# Patient Record
Sex: MALE | Race: OTHER | Employment: FULL TIME | URBAN - METROPOLITAN AREA
[De-identification: names, ages, dates, MRNs, and addresses within clinical notes are randomized per-mention and may not be internally consistent; named-entity substitution may affect disease eponyms.]

---

## 2020-05-11 ENCOUNTER — TELEPHONE (OUTPATIENT)
Dept: ADMINISTRATIVE | Age: 50
End: 2020-05-11

## 2020-05-11 ENCOUNTER — OFFICE VISIT (OUTPATIENT)
Dept: FAMILY MEDICINE CLINIC | Age: 50
End: 2020-05-11
Payer: COMMERCIAL

## 2020-05-11 VITALS
OXYGEN SATURATION: 97 % | TEMPERATURE: 97.6 F | HEART RATE: 58 BPM | DIASTOLIC BLOOD PRESSURE: 58 MMHG | WEIGHT: 199 LBS | SYSTOLIC BLOOD PRESSURE: 124 MMHG

## 2020-05-11 LAB
APPEARANCE FLUID: NORMAL
BILIRUBIN, POC: NORMAL
BLOOD URINE, POC: NORMAL
CLARITY, POC: CLEAR
COLOR, POC: YELLOW
GLUCOSE URINE, POC: NORMAL
KETONES, POC: NORMAL
LEUKOCYTE EST, POC: NORMAL
NITRITE, POC: NORMAL
PH, POC: 5.5
PROTEIN, POC: NORMAL
SPECIFIC GRAVITY, POC: 1.02
UROBILINOGEN, POC: 0.2

## 2020-05-11 PROCEDURE — 99213 OFFICE O/P EST LOW 20 MIN: CPT | Performed by: FAMILY MEDICINE

## 2020-05-11 PROCEDURE — 4004F PT TOBACCO SCREEN RCVD TLK: CPT | Performed by: FAMILY MEDICINE

## 2020-05-11 PROCEDURE — G8428 CUR MEDS NOT DOCUMENT: HCPCS | Performed by: FAMILY MEDICINE

## 2020-05-11 PROCEDURE — 3017F COLORECTAL CA SCREEN DOC REV: CPT | Performed by: FAMILY MEDICINE

## 2020-05-11 PROCEDURE — G8421 BMI NOT CALCULATED: HCPCS | Performed by: FAMILY MEDICINE

## 2020-05-11 PROCEDURE — 81002 URINALYSIS NONAUTO W/O SCOPE: CPT | Performed by: FAMILY MEDICINE

## 2020-05-11 RX ORDER — IBUPROFEN 600 MG/1
600 TABLET ORAL EVERY 6 HOURS PRN
Qty: 60 TABLET | Refills: 1 | Status: SHIPPED
Start: 2020-05-11 | End: 2020-10-15

## 2020-05-11 RX ORDER — METHYLPREDNISOLONE 4 MG/1
TABLET ORAL
Qty: 1 KIT | Refills: 0 | Status: SHIPPED
Start: 2020-05-11 | End: 2020-10-15

## 2020-05-11 RX ORDER — CYCLOBENZAPRINE HCL 10 MG
10 TABLET ORAL 3 TIMES DAILY PRN
Qty: 21 TABLET | Refills: 1 | Status: SHIPPED | OUTPATIENT
Start: 2020-05-11 | End: 2020-05-21

## 2020-05-11 ASSESSMENT — ENCOUNTER SYMPTOMS
RESPIRATORY NEGATIVE: 1
BACK PAIN: 1

## 2020-05-11 NOTE — TELEPHONE ENCOUNTER
I do not see where I have ever seen him, so would be a new patient.   Okay to schedule a virtual visit as long as not on controlled substances etc. according to policy

## 2020-05-11 NOTE — TELEPHONE ENCOUNTER
Patient called to schedule an appointment as he is having some back pain. I do not see any past records for him. He stated he has seen you in the past year and that his children are your patients. Will you take him as New or has he been seen and we do not see the records?  Please advise

## 2020-10-15 ENCOUNTER — OFFICE VISIT (OUTPATIENT)
Dept: PRIMARY CARE CLINIC | Age: 50
End: 2020-10-15
Payer: COMMERCIAL

## 2020-10-15 VITALS
WEIGHT: 197 LBS | HEART RATE: 77 BPM | SYSTOLIC BLOOD PRESSURE: 136 MMHG | BODY MASS INDEX: 29.86 KG/M2 | OXYGEN SATURATION: 97 % | HEIGHT: 68 IN | DIASTOLIC BLOOD PRESSURE: 72 MMHG | TEMPERATURE: 97.8 F

## 2020-10-15 PROBLEM — Z00.00 HEALTH MAINTENANCE EXAMINATION: Status: ACTIVE | Noted: 2020-10-15

## 2020-10-15 PROBLEM — J45.909 UNCOMPLICATED ASTHMA: Status: ACTIVE | Noted: 2020-10-15

## 2020-10-15 PROCEDURE — 99396 PREV VISIT EST AGE 40-64: CPT | Performed by: FAMILY MEDICINE

## 2020-10-15 PROCEDURE — G8484 FLU IMMUNIZE NO ADMIN: HCPCS | Performed by: FAMILY MEDICINE

## 2020-10-15 ASSESSMENT — PATIENT HEALTH QUESTIONNAIRE - PHQ9
SUM OF ALL RESPONSES TO PHQ QUESTIONS 1-9: 0
2. FEELING DOWN, DEPRESSED OR HOPELESS: 0
SUM OF ALL RESPONSES TO PHQ9 QUESTIONS 1 & 2: 0
SUM OF ALL RESPONSES TO PHQ QUESTIONS 1-9: 0
1. LITTLE INTEREST OR PLEASURE IN DOING THINGS: 0
SUM OF ALL RESPONSES TO PHQ QUESTIONS 1-9: 0

## 2020-10-15 NOTE — ASSESSMENT & PLAN NOTE
Health maintenance issues discussed at length as above. Encouraged yearly physicals.   Refer for colonoscopy

## 2020-10-15 NOTE — ASSESSMENT & PLAN NOTE
Counseled. Follows regularly with pulmonologist in [facility], Dr. Ulises Antonio.   Maintained on Breo, rinse mouth after, Singulair, occasional albuterol but uses/needs infrequently

## 2020-10-15 NOTE — PROGRESS NOTES
10/15/20  Edith Webb : 1970 Sex: male  Age: 47 y.o. Chief Complaint   Patient presents with   [facility]   . physical    HPI  HPI:    Patient presents today to reestablish and for physical.  Overall feels well. He is worried about his dad's diagnosis of leukemia. He has no symptoms. Denies fever chills weight loss night sweats or otherwise. Would like blood work. Counseled on weight appropriate diet and exercise as well. Health Maintenance:  Proper diet reviewed including Mediterranean and DASH diets. Counseled on healthy weight, appropriate exercise, avoidance of tobacco, and recommendations for minimal to no alcohol consumption. Counseled on the potential pros and cons of vitamins and supplements. Reviewed the recommendations and risk/benefits of vaccines including Td/Tdap (Declines) ,  Pneumovax (he will discuss with pulm),  prevnar 13 (he will discuss with pulm), flu vaccine (will get thru pulmonologist), Hepatitis vaccines (had B series for work, declines A),  and shingrix (patient had chicken pox) (encouraged). HIV and Hep C screening guidelines were reviewed. (Declines)  Importance of regular eye and dental exams and health reviewed. Risks/Benefits of ASA reviewed and discussed latest guidelines. Sun protection reviewed. Notify if any new or changing moles/skin lesions, etc. Dexa Scan indications/ risk factors for osteoporotic fractures (and associated M/M) and preventative measures reviewed. Counseled on testicular exams and prostate exams. Colonoscopy recommendations reviewed. Pt denies change in bowel habits or [address] of colon polyps/CA. REF. Fit test offered, declines. Defers EKG  Discussed the indications for additional  cardiac testing including referrals, stress testing,  2d echo, ect. Not interested and ASX. Reviewed indications for other testing such as  PFT's.and  indications for imaging including brain, carotid, chest, abdominal, aortic .   Pt is ASX and not interested at this time. Review of Systems  ROS:  Const: Denies chills, fever, malaise and sweats. Eyes: Denies discharge, pain, redness and visual disturbance. ENMT: Denies earaches, other ear symptoms. Denies nasal or sinus symptoms other than stated  above. Denies mouth and tongue lesions and sore throat. CV: Denies chest discomfort, pain; diaphoresis, dizziness, edema, lightheadedness, orthopnea,  palpitations, syncope and near syncopal episode or any exertional symptoms  Resp: Denies cough, hemoptysis, pleuritic pain, SOB, sputum production and wheezing. GI: Denies abdominal pain, change in bowel habits, hematochezia, melena, nausea and vomiting. : Denies urinary symptoms including dysuria , urgency, frequency or hematuria. Musculo: Denies musculoskeletal symptoms. Skin: Denies bruising and rash.   Neuro: Denies headache, numbness, stiff neck, tingling and focal weakness slurred speech or facial  droop  Hema/Lymph: Denies bleeding/bruising tendency and enlarged lymph nodes        Current Outpatient Medications:     Fluticasone Furoate-Vilanterol (BREO ELLIPTA IN), Inhale into the lungs, Disp: , Rfl:     MONTELUKAST SODIUM PO, Take by mouth, Disp: , Rfl:   No Known Allergies    Past Medical History:   Diagnosis Date    Asthma     Follows with Dr Ever Santoro)     Past Surgical History:   Procedure Laterality Date    WISDOM TOOTH EXTRACTION       Family History   Problem Relation Age of Onset    Cancer Father         leukemia, dx age 68    Cancer Paternal Grandmother          leukemia 80    Heart Attack Mother          76     Social History     Tobacco Use    Smoking status: Never Smoker    Smokeless tobacco: Never Used   Substance Use Topics    Alcohol use: Yes     Comment: occ    Drug use: Never      Social History     Social History Narrative    [place]         Wife Manages [place], [place]        3 kids (ages 25, 16, 6)        Grzegorz Basket:    10/15/20 1325   BP: 136/72   Pulse: 77   Temp: 97.8 °F (36.6 °C)   SpO2: 97%   Weight: 197 lb (89.4 kg)   Height: 5' 8\" (1.727 m)      Wt Readings from Last 3 Encounters:   10/15/20 197 lb (89.4 kg)   05/11/20 199 lb (90.3 kg)        Physical Exam  Exam:  Const: Appears comfortable. No signs of acute distress present. Head/Face: Atraumatic on inspection. Eyes: EOMI in both eyes. No discharge from the eyes. PERRL. Sclerae clear. ENMT: Auditory canals normal. Tympanic membranes: intact and translucent. External nose WNL. Nasal mucosa is clear. Oropharynx: No erythema or exudate. Posterior pharynx is normal.  Neck: Supple. Palpation reveals no adenopathy. No masses appreciated. No JVD. Carotids: no  bruits. Resp: Respirations are unlabored. Clear to auscultation. No rales, rhonchi or wheezes appreciated  over the lungs bilaterally. CV: Rate is regular. Rhythm is regular. No gallop or rubs. No heart murmur appreciated. Extremities: No clubbing, cyanosis, or edema. No calf inflammation or tenderness. Abdomen: Bowel sounds are normoactive. Abdomen is soft, nontender, and nondistended. No  abdominal masses. No palpable hepatosplenomegaly. Lymph: No palpable or visible regional lymphadenopathy. Musculoskeletal: no acute joint inflammation. Skin: Dry and warm with no rash. Skin normal to inspection and palpation overall. Neuro: Alert and oriented. Affect: appropriate. Upper Extremities: 5/5 bilaterally. Lower Extremities:  5/5 bilaterally. Sensation intact to light touch. Reflexes: DTR's are symmetric and 2+ bilaterally. .  Cranial Nerves: Cranial nerves grossly intact. Genitalia NL male, testes descended without nodularity or hernia with and without valsalva. Rectal NL tone, stools heme neg. Prostate smoothe, not enlarged, without appreciable nodularity or asymmetry. Office Labs This Visit :  No results found for this visit on 10/15/20. Assessment and Plan:   Diagnosis Orders   1.  Health maintenance examination  Psa screening    Lipid Panel    TSH without Reflex    Comprehensive Metabolic Panel    CBC Auto Differential    Urinalysis   2. Uncomplicated asthma, unspecified asthma severity, unspecified whether persistent     3. Colon cancer screening  Ambulatory referral to General Surgery       Health maintenance examination  Health maintenance issues discussed at length as above. Encouraged yearly physicals. Refer for colonoscopy    Uncomplicated asthma  Counseled. Follows regularly with pulmonologist in [facility], Dr. Christie Barrett. Maintained on Breo, rinse mouth after, Singulair, occasional albuterol but uses/needs infrequently         No flowsheet data found. Plan as above. Counseled extensively and differential diagnoses relevant to above were reviewed, including serious etiologies. Side effects and interactions of medications were reviewed. Check with insurance, get blood work call for results follow-up with abnormal as needed otherwise follow-up 1 year sooner as needed. Refer for colonoscopy. As long as symptoms steadily improve/resolve, and medical conditions follow the expected course, FU as below, sooner PRN. Return in about 1 year (around 10/15/2021), or if symptoms worsen or fail to improve, for physical.         Signs and symptoms to watch for discussed, serious signs and symptoms reviewed. ER if any. Alise Macdonald MD    Patients are advised to check with insurance company to ensure coverage and to fully understand benefits and cost prior to any testing. This note was created with the assistance of voice recognition software. Document was reviewed however may contain grammatical errors.

## 2020-10-26 ENCOUNTER — HOSPITAL ENCOUNTER (OUTPATIENT)
Age: 50
Discharge: HOME | End: 2020-10-28
Payer: COMMERCIAL

## 2020-10-26 LAB
ALBUMIN SERPL-MCNC: 4.4 G/DL (ref 3.5–5.2)
ALP BLD-CCNC: 46 U/L (ref 40–129)
ALT SERPL-CCNC: 22 U/L (ref 0–40)
ANION GAP SERPL CALCULATED.3IONS-SCNC: 13 MMOL/L (ref 7–16)
AST SERPL-CCNC: 27 U/L (ref 0–39)
BACTERIA: ABNORMAL /HPF
BASOPHILS ABSOLUTE: 0.04 E9/L (ref 0–0.2)
BASOPHILS RELATIVE PERCENT: 0.8 % (ref 0–2)
BILIRUB SERPL-MCNC: 0.8 MG/DL (ref 0–1.2)
BILIRUBIN URINE: NEGATIVE
BLOOD, URINE: NEGATIVE
BUN BLDV-MCNC: 15 MG/DL (ref 6–20)
CALCIUM SERPL-MCNC: 9.8 MG/DL (ref 8.6–10.2)
CHLORIDE BLD-SCNC: 101 MMOL/L (ref 98–107)
CHOLESTEROL, TOTAL: 209 MG/DL (ref 0–199)
CLARITY: ABNORMAL
CO2: 22 MMOL/L (ref 22–29)
COLOR: YELLOW
CREAT SERPL-MCNC: 1.4 MG/DL (ref 0.7–1.2)
EOSINOPHILS ABSOLUTE: 0.04 E9/L (ref 0.05–0.5)
EOSINOPHILS RELATIVE PERCENT: 0.8 % (ref 0–6)
GFR AFRICAN AMERICAN: >60
GFR NON-AFRICAN AMERICAN: 53 ML/MIN/1.73
GLUCOSE BLD-MCNC: 90 MG/DL (ref 74–99)
GLUCOSE URINE: NEGATIVE MG/DL
HCT VFR BLD CALC: 43.3 % (ref 37–54)
HDLC SERPL-MCNC: 37 MG/DL
HEMOGLOBIN: 14.6 G/DL (ref 12.5–16.5)
IMMATURE GRANULOCYTES #: 0.02 E9/L
IMMATURE GRANULOCYTES %: 0.4 % (ref 0–5)
KETONES, URINE: NEGATIVE MG/DL
LDL CHOLESTEROL CALCULATED: 138 MG/DL (ref 0–99)
LEUKOCYTE ESTERASE, URINE: NEGATIVE
LYMPHOCYTES ABSOLUTE: 1.05 E9/L (ref 1.5–4)
LYMPHOCYTES RELATIVE PERCENT: 21.8 % (ref 20–42)
MCH RBC QN AUTO: 29.3 PG (ref 26–35)
MCHC RBC AUTO-ENTMCNC: 33.7 % (ref 32–34.5)
MCV RBC AUTO: 86.8 FL (ref 80–99.9)
MONOCYTES ABSOLUTE: 0.32 E9/L (ref 0.1–0.95)
MONOCYTES RELATIVE PERCENT: 6.6 % (ref 2–12)
NEUTROPHILS ABSOLUTE: 3.35 E9/L (ref 1.8–7.3)
NEUTROPHILS RELATIVE PERCENT: 69.6 % (ref 43–80)
NITRITE, URINE: NEGATIVE
PDW BLD-RTO: 11.8 FL (ref 11.5–15)
PH UA: 5.5 (ref 5–9)
PLATELET # BLD: 311 E9/L (ref 130–450)
PMV BLD AUTO: 9 FL (ref 7–12)
POTASSIUM SERPL-SCNC: 4.6 MMOL/L (ref 3.5–5)
PROSTATE SPECIFIC ANTIGEN: 0.42 NG/ML (ref 0–4)
PROTEIN UA: NEGATIVE MG/DL
RBC # BLD: 4.99 E12/L (ref 3.8–5.8)
RBC UA: ABNORMAL /HPF (ref 0–2)
SODIUM BLD-SCNC: 136 MMOL/L (ref 132–146)
SPECIFIC GRAVITY UA: >=1.03 (ref 1–1.03)
TOTAL PROTEIN: 8.5 G/DL (ref 6.4–8.3)
TRIGL SERPL-MCNC: 170 MG/DL (ref 0–149)
TSH SERPL DL<=0.05 MIU/L-ACNC: 2.88 UIU/ML (ref 0.27–4.2)
UROBILINOGEN, URINE: 0.2 E.U./DL
VLDLC SERPL CALC-MCNC: 34 MG/DL
WBC # BLD: 4.8 E9/L (ref 4.5–11.5)
WBC UA: ABNORMAL /HPF (ref 0–5)

## 2020-10-26 PROCEDURE — G0103 PSA SCREENING: HCPCS

## 2020-10-26 PROCEDURE — 80053 COMPREHEN METABOLIC PANEL: CPT

## 2020-10-26 PROCEDURE — 36415 COLL VENOUS BLD VENIPUNCTURE: CPT

## 2020-10-26 PROCEDURE — 81001 URINALYSIS AUTO W/SCOPE: CPT

## 2020-10-26 PROCEDURE — 85025 COMPLETE CBC W/AUTO DIFF WBC: CPT

## 2020-10-26 PROCEDURE — 80061 LIPID PANEL: CPT

## 2020-10-26 PROCEDURE — 84443 ASSAY THYROID STIM HORMONE: CPT

## 2020-10-28 ENCOUNTER — TELEPHONE (OUTPATIENT)
Dept: PRIMARY CARE CLINIC | Age: 50
End: 2020-10-28

## 2020-10-28 NOTE — RESULT ENCOUNTER NOTE
I reviewed above blood work with the patient. No urinary symptoms. No purulence or signs of infection. Specific gravity was greater than 1.030. He did have some water before blood work. Fasted all night. Creatinine 1.4, protein 8.5, counseled on triglyceride 170  lifestyle modification reviewed. TSH normal, CBC grossly normal, differential reviewed. He has absolutely no symptoms. Does not eat excessive protein or take supplements. No personal or family history of renal disease. Typically does not take NSAIDs. We did discuss avoidance of nephrotoxic agents including NSAIDs, excessive protein and encouraged proper hydration. At this point it was decided upon to repeat BUN/creatinine and protein with proper hydration. If continued abnormalities we would then consider imaging and referral.  He will do this, call for results follow-up of abnormal and as needed.

## 2020-11-13 ENCOUNTER — TELEPHONE (OUTPATIENT)
Dept: SURGERY | Age: 50
End: 2020-11-13

## 2020-11-13 NOTE — TELEPHONE ENCOUNTER
MA called the patient back and left a message to call the office back.     Electronically signed by Natasha Cabrera on 11/13/20 at 2:57 PM EST

## 2020-11-14 PROBLEM — Z00.00 HEALTH MAINTENANCE EXAMINATION: Status: RESOLVED | Noted: 2020-10-15 | Resolved: 2020-11-14

## 2021-02-25 ENCOUNTER — HOSPITAL ENCOUNTER (EMERGENCY)
Age: 51
Discharge: HOME | End: 2021-02-25
Attending: EMERGENCY MEDICINE
Payer: COMMERCIAL

## 2021-02-25 ENCOUNTER — APPOINTMENT (OUTPATIENT)
Dept: GENERAL RADIOLOGY | Age: 51
End: 2021-02-25
Payer: COMMERCIAL

## 2021-02-25 VITALS
HEIGHT: 68 IN | TEMPERATURE: 97.8 F | SYSTOLIC BLOOD PRESSURE: 146 MMHG | WEIGHT: 190 LBS | BODY MASS INDEX: 28.79 KG/M2 | RESPIRATION RATE: 16 BRPM | DIASTOLIC BLOOD PRESSURE: 83 MMHG | OXYGEN SATURATION: 98 % | HEART RATE: 60 BPM

## 2021-02-25 DIAGNOSIS — S90.511A ABRASION OF RIGHT ANKLE, INITIAL ENCOUNTER: ICD-10-CM

## 2021-02-25 DIAGNOSIS — S82.401A CLOSED FRACTURE OF RIGHT TIBIA AND FIBULA, INITIAL ENCOUNTER: Primary | ICD-10-CM

## 2021-02-25 DIAGNOSIS — S82.201A CLOSED FRACTURE OF RIGHT TIBIA AND FIBULA, INITIAL ENCOUNTER: Primary | ICD-10-CM

## 2021-02-25 PROCEDURE — 96374 THER/PROPH/DIAG INJ IV PUSH: CPT

## 2021-02-25 PROCEDURE — 99284 EMERGENCY DEPT VISIT MOD MDM: CPT

## 2021-02-25 PROCEDURE — 90471 IMMUNIZATION ADMIN: CPT | Performed by: EMERGENCY MEDICINE

## 2021-02-25 PROCEDURE — 6360000002 HC RX W HCPCS

## 2021-02-25 PROCEDURE — 90715 TDAP VACCINE 7 YRS/> IM: CPT | Performed by: EMERGENCY MEDICINE

## 2021-02-25 PROCEDURE — 73610 X-RAY EXAM OF ANKLE: CPT

## 2021-02-25 PROCEDURE — 73564 X-RAY EXAM KNEE 4 OR MORE: CPT

## 2021-02-25 PROCEDURE — 6360000002 HC RX W HCPCS: Performed by: EMERGENCY MEDICINE

## 2021-02-25 PROCEDURE — 29515 APPLICATION SHORT LEG SPLINT: CPT

## 2021-02-25 PROCEDURE — 73590 X-RAY EXAM OF LOWER LEG: CPT

## 2021-02-25 PROCEDURE — 6370000000 HC RX 637 (ALT 250 FOR IP)

## 2021-02-25 RX ORDER — GINSENG 100 MG
CAPSULE ORAL ONCE
Status: DISCONTINUED | OUTPATIENT
Start: 2021-02-25 | End: 2021-02-25 | Stop reason: HOSPADM

## 2021-02-25 RX ORDER — KETOROLAC TROMETHAMINE 30 MG/ML
15 INJECTION, SOLUTION INTRAMUSCULAR; INTRAVENOUS ONCE
Status: COMPLETED | OUTPATIENT
Start: 2021-02-25 | End: 2021-02-25

## 2021-02-25 RX ORDER — HYDROCODONE BITARTRATE AND ACETAMINOPHEN 5; 325 MG/1; MG/1
1 TABLET ORAL EVERY 6 HOURS PRN
Qty: 12 TABLET | Refills: 0 | Status: SHIPPED | OUTPATIENT
Start: 2021-02-25 | End: 2021-02-28

## 2021-02-25 RX ORDER — FENTANYL CITRATE 50 UG/ML
50 INJECTION, SOLUTION INTRAMUSCULAR; INTRAVENOUS ONCE
Status: DISCONTINUED | OUTPATIENT
Start: 2021-02-25 | End: 2021-02-25 | Stop reason: HOSPADM

## 2021-02-25 RX ORDER — KETOROLAC TROMETHAMINE 30 MG/ML
INJECTION, SOLUTION INTRAMUSCULAR; INTRAVENOUS
Status: COMPLETED
Start: 2021-02-25 | End: 2021-02-25

## 2021-02-25 RX ORDER — DIAPER,BRIEF,INFANT-TODD,DISP
EACH MISCELLANEOUS
Status: COMPLETED
Start: 2021-02-25 | End: 2021-02-25

## 2021-02-25 RX ADMIN — TETANUS TOXOID, REDUCED DIPHTHERIA TOXOID AND ACELLULAR PERTUSSIS VACCINE, ADSORBED 0.5 ML: 5; 2.5; 8; 8; 2.5 SUSPENSION INTRAMUSCULAR at 08:29

## 2021-02-25 RX ADMIN — KETOROLAC TROMETHAMINE 30 MG: 30 INJECTION, SOLUTION INTRAMUSCULAR; INTRAVENOUS at 07:45

## 2021-02-25 RX ADMIN — BACITRACIN: 500 OINTMENT TOPICAL at 08:29

## 2021-02-25 ASSESSMENT — PAIN SCALES - GENERAL: PAINLEVEL_OUTOF10: 8

## 2021-02-25 NOTE — ED NOTES
Bed: 22  Expected date:   Expected time:   Means of arrival:   Comments:  [address], RN  02/25/21 0573

## 2021-02-25 NOTE — ED PROVIDER NOTES
HPI:  2/25/21,   Time: 6:44 AM AVA Gunn is a 45 y.o. male presenting to the ED for RT ankle pain, GLF, beginning several minutes ago. The complaint has been persistent, moderate in severity, and worsened by standing, walking. Patient said he was running at work down the driveway slipped and fell twisted his right ankle. Patient is having pain with standing and ambulation. Thinks he may have fractured his ankle. No previous injuries. He has no neurological complaints. Denies chest pain shortness of breath. ROS:   Pertinent positives and negatives are stated within HPI, all other systems reviewed and are negative.  --------------------------------------------- PAST HISTORY ---------------------------------------------  Past Medical History:  has a past medical history of Asthma. Past Surgical History:  has a past surgical history that includes [place] tooth extraction. Social History:  reports that he has never smoked. He has never used smokeless tobacco. He reports current alcohol use. He reports that he does not use drugs. Family History: family history includes Cancer in his father and paternal grandmother; Heart Attack in his mother. The patients home medications have been reviewed. Allergies: Patient has no known allergies. -------------------------------------------------- RESULTS -------------------------------------------------  All laboratory and radiology results have been personally reviewed by myself   LABS:  No results found for this visit on 02/25/21. RADIOLOGY:  Interpreted by Radiologist.  XR KNEE RIGHT (MIN 4 VIEWS)   Final Result   No acute osseous abnormality      XR TIBIA FIBULA RIGHT (2 VIEWS)   Final Result   Acute minimally displaced fracture of the distal fibula. Probable fracture of the posterior malleolus. XR ANKLE RIGHT (MIN 3 VIEWS)   Final Result   Acute minimally displaced fracture of the distal fibula.    Probable fracture of the PROCEDURE NOTE  2/25/21       Time: 8615    SPLINT  APPLICATION  Risks, benefits and alternatives (for applicable procedures below) described. Performed By: Nelly Bishop DO. Indication:  fracture of RT ankle . Procedure:   A short  right leg  Lateral splint was applied by me. The patient did tolerate the procedure well. Patient is able to move toes,  cap refills less than 2 seconds post splint. Counseling: The emergency provider has spoken with the patient and discussed todays results, in addition to providing specific details for the plan of care and counseling regarding the diagnosis and prognosis. Questions are answered at this time and they are agreeable with the plan.      --------------------------------- IMPRESSION AND DISPOSITION ---------------------------------    IMPRESSION  1. Closed fracture of right tibia and fibula, initial encounter    2.  Abrasion of right ankle, initial encounter        DISPOSITION  Disposition: Discharge to home  Patient condition is stable                Nelly Bishop DO  02/26/21 1248

## 2022-11-09 ENCOUNTER — HOSPITAL ENCOUNTER (EMERGENCY)
Age: 52
Discharge: LEFT BEFORE DISCHARGE | End: 2022-11-09
Payer: COMMERCIAL

## 2022-11-09 ENCOUNTER — APPOINTMENT (OUTPATIENT)
Dept: GENERAL RADIOLOGY | Age: 52
End: 2022-11-09
Payer: COMMERCIAL

## 2022-11-09 VITALS
SYSTOLIC BLOOD PRESSURE: 143 MMHG | WEIGHT: 198 LBS | HEIGHT: 68 IN | BODY MASS INDEX: 30.01 KG/M2 | RESPIRATION RATE: 20 BRPM | TEMPERATURE: 99.8 F | OXYGEN SATURATION: 99 % | DIASTOLIC BLOOD PRESSURE: 107 MMHG | HEART RATE: 89 BPM

## 2022-11-09 LAB
ANION GAP SERPL CALCULATED.3IONS-SCNC: 8 MMOL/L (ref 7–16)
BASOPHILS ABSOLUTE: 0.03 E9/L (ref 0–0.2)
BASOPHILS RELATIVE PERCENT: 0.3 % (ref 0–2)
BUN BLDV-MCNC: 12 MG/DL (ref 6–20)
CALCIUM SERPL-MCNC: 9.3 MG/DL (ref 8.6–10.2)
CHLORIDE BLD-SCNC: 99 MMOL/L (ref 98–107)
CO2: 28 MMOL/L (ref 22–29)
CREAT SERPL-MCNC: 1.1 MG/DL (ref 0.7–1.2)
D DIMER: 875 NG/ML DDU
EOSINOPHILS ABSOLUTE: 0.07 E9/L (ref 0.05–0.5)
EOSINOPHILS RELATIVE PERCENT: 0.7 % (ref 0–6)
GFR SERPL CREATININE-BSD FRML MDRD: >60 ML/MIN/1.73
GLUCOSE BLD-MCNC: 107 MG/DL (ref 74–99)
HCT VFR BLD CALC: 40.4 % (ref 37–54)
HEMOGLOBIN: 14.1 G/DL (ref 12.5–16.5)
IMMATURE GRANULOCYTES #: 0.03 E9/L
IMMATURE GRANULOCYTES %: 0.3 % (ref 0–5)
INFLUENZA A BY PCR: NOT DETECTED
INFLUENZA B BY PCR: NOT DETECTED
LYMPHOCYTES ABSOLUTE: 1.68 E9/L (ref 1.5–4)
LYMPHOCYTES RELATIVE PERCENT: 17.1 % (ref 20–42)
MCH RBC QN AUTO: 30.1 PG (ref 26–35)
MCHC RBC AUTO-ENTMCNC: 34.9 % (ref 32–34.5)
MCV RBC AUTO: 86.1 FL (ref 80–99.9)
MONOCYTES ABSOLUTE: 0.51 E9/L (ref 0.1–0.95)
MONOCYTES RELATIVE PERCENT: 5.2 % (ref 2–12)
NEUTROPHILS ABSOLUTE: 7.49 E9/L (ref 1.8–7.3)
NEUTROPHILS RELATIVE PERCENT: 76.4 % (ref 43–80)
PDW BLD-RTO: 11.8 FL (ref 11.5–15)
PLATELET # BLD: 234 E9/L (ref 130–450)
PMV BLD AUTO: 8.3 FL (ref 7–12)
POTASSIUM REFLEX MAGNESIUM: 4.1 MMOL/L (ref 3.5–5)
PRO-BNP: 29 PG/ML (ref 0–125)
RBC # BLD: 4.69 E12/L (ref 3.8–5.8)
SARS-COV-2, NAAT: NOT DETECTED
SODIUM BLD-SCNC: 135 MMOL/L (ref 132–146)
TROPONIN, HIGH SENSITIVITY: 8 NG/L (ref 0–11)
WBC # BLD: 9.8 E9/L (ref 4.5–11.5)

## 2022-11-09 PROCEDURE — 84484 ASSAY OF TROPONIN QUANT: CPT

## 2022-11-09 PROCEDURE — 93005 ELECTROCARDIOGRAM TRACING: CPT

## 2022-11-09 PROCEDURE — 80048 BASIC METABOLIC PNL TOTAL CA: CPT

## 2022-11-09 PROCEDURE — 83880 ASSAY OF NATRIURETIC PEPTIDE: CPT

## 2022-11-09 PROCEDURE — 85378 FIBRIN DEGRADE SEMIQUANT: CPT

## 2022-11-09 PROCEDURE — 85025 COMPLETE CBC W/AUTO DIFF WBC: CPT

## 2022-11-09 PROCEDURE — 36415 COLL VENOUS BLD VENIPUNCTURE: CPT

## 2022-11-09 PROCEDURE — 87502 INFLUENZA DNA AMP PROBE: CPT

## 2022-11-09 PROCEDURE — 99285 EMERGENCY DEPT VISIT HI MDM: CPT

## 2022-11-09 PROCEDURE — 71046 X-RAY EXAM CHEST 2 VIEWS: CPT

## 2022-11-09 PROCEDURE — 87635 SARS-COV-2 COVID-19 AMP PRB: CPT

## 2022-11-09 ASSESSMENT — PAIN SCALES - GENERAL: PAINLEVEL_OUTOF10: 5

## 2022-11-09 ASSESSMENT — PAIN - FUNCTIONAL ASSESSMENT: PAIN_FUNCTIONAL_ASSESSMENT: 0-10

## 2022-11-10 ENCOUNTER — TELEPHONE (OUTPATIENT)
Dept: PRIMARY CARE CLINIC | Age: 52
End: 2022-11-10

## 2022-11-10 LAB
EKG ATRIAL RATE: 84 BPM
EKG P AXIS: 60 DEGREES
EKG P-R INTERVAL: 156 MS
EKG Q-T INTERVAL: 350 MS
EKG QRS DURATION: 80 MS
EKG QTC CALCULATION (BAZETT): 413 MS
EKG R AXIS: 83 DEGREES
EKG T AXIS: 31 DEGREES
EKG VENTRICULAR RATE: 84 BPM

## 2022-11-10 PROCEDURE — 93010 ELECTROCARDIOGRAM REPORT: CPT | Performed by: INTERNAL MEDICINE

## 2022-11-10 ASSESSMENT — PATIENT HEALTH QUESTIONNAIRE - PHQ9
1. LITTLE INTEREST OR PLEASURE IN DOING THINGS: 0
SUM OF ALL RESPONSES TO PHQ QUESTIONS 1-9: 0
2. FEELING DOWN, DEPRESSED OR HOPELESS: 0
SUM OF ALL RESPONSES TO PHQ9 QUESTIONS 1 & 2: 0
SUM OF ALL RESPONSES TO PHQ QUESTIONS 1-9: 0

## 2022-11-10 NOTE — TELEPHONE ENCOUNTER
Patient calling he was in the ER yesterday for shortness of breath and chest pain. He is asking if you could review his records states ER was so busy he never got to see or talk to a doctor. Per chart notes he left before treatment was complete. I did schedule follow up with you tomorrow.

## 2022-11-10 NOTE — ED NOTES
Department of Emergency Medicine  FIRST PROVIDER TRIAGE NOTE             Independent MLP           11/9/22  7:47 PM EST    Date of Encounter: 11/9/22   MRN: [id number]      HPI: Kenan Saleem is a 45 y.o. male who presents to the ED for No chief complaint on file. Patient complains of a cough. Started yesterday. He states that about an hour prior to arrival.  Chest pain as well as shortness of breath. Denies fevers or chills. No known sick exposures. ROS: Negative for abd pain or fever. PE: Gen Appearance/Constitutional: alert  CV: regular rate  Pulm: CTA bilat     Initial Plan of Care: All treatment areas with department are currently occupied. Plan to order/Initiate the following while awaiting opening in ED: labs, EKG, and imaging studies.   Initiate Treatment-Testing, Proceed toTreatment Area When Bed Available for ED Attending/MLP to Continue Care    Electronically signed by ANDREA Roman CNP   DD: 11/9/22       ANDREA Roman CNP  11/09/22 1948

## 2022-11-10 NOTE — TELEPHONE ENCOUNTER
Testing appears complete and there is a note that he left prior to completion of testing/treatment. Unfortunately I would advise him to return. The testing that was done included a D-dimer that was elevated, that could indicate a blood clot, chest x-ray showing opacities that could indicate pneumonia or otherwise. Further testing recommended given the report of his symptoms and the abnormal testing, unfortunately ER is most appropriate to rule out these types of conditions.

## 2022-11-10 NOTE — ED NOTES
The following labs were labeled with appropriate pt sticker and tubed to lab:     [x] Blue     [x] Lavender   [] on ice  [x] Green/yellow  [] Green/black [] on ice  [] Dorthula Karan  [] on ice  [] Yellow  [] Red  [] Type/ Screen  [] ABG  [] VBG    [x] COVID-19 swab    [] Rapid  [] PCR  [x] Flu swab  [] Peds Viral Panel     [] Urine Sample  [] Pelvic Cultures  [] Blood Cultures  [] X 2  [] STREP Cultures         Lopez Gutierrez RN  11/09/22 2008

## 2022-11-11 ENCOUNTER — TELEPHONE (OUTPATIENT)
Dept: PRIMARY CARE CLINIC | Age: 52
End: 2022-11-11

## 2022-11-11 NOTE — TELEPHONE ENCOUNTER
Called and spoke with patient, [facility], had CT, confirmed pneumonia, they placed him on prednisone Levaquin. He is feeling better. Blood pressure normalized. Risk benefits Levaquin reviewed including but not limited to prolonged QT explained arrhythmia, tendinosis tendon rupture C. difficile vascular etc.  He is feeling better going to follow-up next week sooner as needed.

## 2022-11-16 ENCOUNTER — OFFICE VISIT (OUTPATIENT)
Dept: PRIMARY CARE CLINIC | Age: 52
End: 2022-11-16
Payer: COMMERCIAL

## 2022-11-16 VITALS
WEIGHT: 202 LBS | OXYGEN SATURATION: 96 % | TEMPERATURE: 97.5 F | DIASTOLIC BLOOD PRESSURE: 74 MMHG | SYSTOLIC BLOOD PRESSURE: 132 MMHG | HEART RATE: 71 BPM | BODY MASS INDEX: 30.71 KG/M2

## 2022-11-16 DIAGNOSIS — M79.604 RIGHT LEG PAIN: ICD-10-CM

## 2022-11-16 DIAGNOSIS — J18.9 PNEUMONIA OF BOTH LOWER LOBES DUE TO INFECTIOUS ORGANISM: Primary | ICD-10-CM

## 2022-11-16 DIAGNOSIS — E87.1 HYPONATREMIA: ICD-10-CM

## 2022-11-16 DIAGNOSIS — R77.9 ELEVATED BLOOD PROTEIN: ICD-10-CM

## 2022-11-16 DIAGNOSIS — J45.909 UNCOMPLICATED ASTHMA, UNSPECIFIED ASTHMA SEVERITY, UNSPECIFIED WHETHER PERSISTENT: ICD-10-CM

## 2022-11-16 DIAGNOSIS — D72.829 LEUKOCYTOSIS, UNSPECIFIED TYPE: ICD-10-CM

## 2022-11-16 PROCEDURE — 3017F COLORECTAL CA SCREEN DOC REV: CPT | Performed by: FAMILY MEDICINE

## 2022-11-16 PROCEDURE — G8417 CALC BMI ABV UP PARAM F/U: HCPCS | Performed by: FAMILY MEDICINE

## 2022-11-16 PROCEDURE — 1036F TOBACCO NON-USER: CPT | Performed by: FAMILY MEDICINE

## 2022-11-16 PROCEDURE — 99214 OFFICE O/P EST MOD 30 MIN: CPT | Performed by: FAMILY MEDICINE

## 2022-11-16 PROCEDURE — G8484 FLU IMMUNIZE NO ADMIN: HCPCS | Performed by: FAMILY MEDICINE

## 2022-11-16 PROCEDURE — G8427 DOCREV CUR MEDS BY ELIG CLIN: HCPCS | Performed by: FAMILY MEDICINE

## 2022-11-16 RX ORDER — DOXYCYCLINE HYCLATE 100 MG/1
100 CAPSULE ORAL 2 TIMES DAILY
Qty: 20 CAPSULE | Refills: 0 | Status: SHIPPED | OUTPATIENT
Start: 2022-11-16 | End: 2022-11-26

## 2022-11-16 RX ORDER — CEFTRIAXONE 1 G/1
1000 INJECTION, POWDER, FOR SOLUTION INTRAMUSCULAR; INTRAVENOUS ONCE
Status: COMPLETED | OUTPATIENT
Start: 2022-11-16 | End: 2022-11-16

## 2022-11-16 RX ORDER — PREDNISONE 10 MG/1
TABLET ORAL
Qty: 21 TABLET | Refills: 0 | Status: SHIPPED | OUTPATIENT
Start: 2022-11-16

## 2022-11-16 RX ADMIN — CEFTRIAXONE 1000 MG: 1 INJECTION, POWDER, FOR SOLUTION INTRAMUSCULAR; INTRAVENOUS at 11:32

## 2022-11-16 SDOH — ECONOMIC STABILITY: FOOD INSECURITY: WITHIN THE PAST 12 MONTHS, THE FOOD YOU BOUGHT JUST DIDN'T LAST AND YOU DIDN'T HAVE MONEY TO GET MORE.: NEVER TRUE

## 2022-11-16 SDOH — ECONOMIC STABILITY: FOOD INSECURITY: WITHIN THE PAST 12 MONTHS, YOU WORRIED THAT YOUR FOOD WOULD RUN OUT BEFORE YOU GOT MONEY TO BUY MORE.: NEVER TRUE

## 2022-11-16 ASSESSMENT — PATIENT HEALTH QUESTIONNAIRE - PHQ9
SUM OF ALL RESPONSES TO PHQ QUESTIONS 1-9: 0
SUM OF ALL RESPONSES TO PHQ QUESTIONS 1-9: 0
SUM OF ALL RESPONSES TO PHQ9 QUESTIONS 1 & 2: 0
SUM OF ALL RESPONSES TO PHQ QUESTIONS 1-9: 0
SUM OF ALL RESPONSES TO PHQ QUESTIONS 1-9: 0
2. FEELING DOWN, DEPRESSED OR HOPELESS: 0
1. LITTLE INTEREST OR PLEASURE IN DOING THINGS: 0

## 2022-11-16 ASSESSMENT — SOCIAL DETERMINANTS OF HEALTH (SDOH): HOW HARD IS IT FOR YOU TO PAY FOR THE VERY BASICS LIKE FOOD, HOUSING, MEDICAL CARE, AND HEATING?: NOT HARD AT ALL

## 2022-11-16 NOTE — PROGRESS NOTES
Maria Dolores Villalobos : 1970 Sex: male  Age: 45 y.o. Chief Complaint   Patient presents with    Follow-Up from Hospital     ER 11/10 cough       HPI  HPI:       Patient presents today for multiple ER follow-up. He suddenly developed cough with shortness of breath, presented to [facility] emergency room 2022 at which time he was shown to have a hazy left basilar opacity and blood work including glucose 107 troponin 8 CBC grossly normal and D-dimer elevated with COVID and flu negative. He left prematurely, continue to have symptoms and proceeded to [facility] ER 11/10/2022. There he had a CT scan showing bilateral lower lobe infiltrates that they felt was consistent with pneumonia. White count of about 16.5, protein slightly high, sodium low. They gave him 1 dose of cefdinir and Zithromax and Levaquin in the ER, sent him home on Levaquin 750 mg for an additional 5 days plus prednisone 50 mg daily for 5 days. He is feeling much better, but still gets dry cough with intermittent wheezing and this wheezing is associated shortness of breath, alleviated for a short period time after albuterol treatment. Cough nonproductive, no other shortness of breath or chest pain fever chills malaise or abdominal symptoms but feels he needs additional antibiotic therapy as he felt much better while taking the antibiotic. Precautions reviewed. Precautions on antibiotic reviewed including resistant C. difficile prolonged QT tendinosis and tendon rupture especially with quinolones and steroids. He does have appointment with his pulmonologist for chronic asthma in about a week or 2. He also developed right calf tenderness yesterday which is much improved today. No redness or warmth. He thinks a cramp but wants to ensure not a DVT. He had no shortness of breath symptoms prior to the development of cough that was sudden onset.   We did discuss associations of dyspnea on exertion with cardiac, defers additional testing in that regard    XR CHEST (2 VW)    Result Date: 11/9/2022  EXAMINATION: TWO XRAY VIEWS OF THE CHEST 11/9/2022 8:17 pm COMPARISON: None. HISTORY: ORDERING SYSTEM PROVIDED HISTORY: chest pain TECHNOLOGIST PROVIDED HISTORY: Reason for exam:->chest pain FINDINGS: The cardiomediastinal silhouette is within normal limits. There are hazy left basilar opacities. No pneumothorax or pleural effusion. Hazy left basilar opacities, could represent atelectasis versus pneumonia.     Most Recent Labs  CBC  Lab Results   Component Value Date/Time    WBC 9.8 11/09/2022 07:55 PM    WBC 4.8 10/26/2020 09:48 AM    RBC 4.69 11/09/2022 07:55 PM    RBC 4.99 10/26/2020 09:48 AM    HGB 14.1 11/09/2022 07:55 PM    HGB 14.6 10/26/2020 09:48 AM    HCT 40.4 11/09/2022 07:55 PM    HCT 43.3 10/26/2020 09:48 AM    MCV 86.1 11/09/2022 07:55 PM    MCV 86.8 10/26/2020 09:48 AM     11/09/2022 07:55 PM     10/26/2020 09:48 AM      CMP  Lab Results   Component Value Date/Time     11/09/2022 07:55 PM     10/26/2020 09:48 AM    K 4.1 11/09/2022 07:55 PM    K 4.6 10/26/2020 09:48 AM    CL 99 11/09/2022 07:55 PM     10/26/2020 09:48 AM    CO2 28 11/09/2022 07:55 PM    CO2 22 10/26/2020 09:48 AM    ANIONGAP 8 11/09/2022 07:55 PM    ANIONGAP 13 10/26/2020 09:48 AM    GLUCOSE 107 11/09/2022 07:55 PM    GLUCOSE 90 10/26/2020 09:48 AM    BUN 12 11/09/2022 07:55 PM    BUN 15 10/26/2020 09:48 AM    CREATININE 1.1 11/09/2022 07:55 PM    CREATININE 1.4 10/26/2020 09:48 AM    LABGLOM >60 11/09/2022 07:55 PM    LABGLOM 53 10/26/2020 09:48 AM    GFRAA >60 10/26/2020 09:48 AM    CALCIUM 9.3 11/09/2022 07:55 PM    CALCIUM 9.8 10/26/2020 09:48 AM    PROT 8.5 10/26/2020 09:48 AM    LABALBU 4.4 10/26/2020 09:48 AM    BILITOT 0.8 10/26/2020 09:48 AM    ALKPHOS 46 10/26/2020 09:48 AM    AST 27 10/26/2020 09:48 AM    ALT 22 10/26/2020 09:48 AM     A1C  No results found for: LABA1C  TSH  Lab Results   Component Value Date/Time TSH 2.880 10/26/2020 09:48 AM     FREET4  No results found for: L1RZIRL  LIPID  Lab Results   Component Value Date/Time    CHOL 209 10/26/2020 09:48 AM    HDL 37 10/26/2020 09:48 AM    LDLCALC 138 10/26/2020 09:48 AM    TRIG 170 10/26/2020 09:48 AM     VITAMIN D  No results found for: VITD25  MAGNESIUM  No results found for: MG   PHOS  No results found for: PHOS   VIOLETA   No results found for: VIOLETA  RHEUMATOID FACTOR  No results found for: RF  PSA  Lab Results   Component Value Date/Time    PSA 0.42 10/26/2020 09:48 AM      HEPATITIS C  No results found for: HCVABI  HIV  No results found for: NBN9AOW, HIV1QT  UA  Lab Results   Component Value Date/Time    COLORU Yellow 10/26/2020 09:48 AM    COLORU yellow 05/11/2020 11:40 AM    CLARITYU CLOUDY 10/26/2020 09:48 AM    CLARITYU clear 05/11/2020 11:40 AM    GLUCOSEU Negative 10/26/2020 09:48 AM    GLUCOSEU neg 05/11/2020 11:40 AM    BILIRUBINUR Negative 10/26/2020 09:48 AM    BILIRUBINUR neg 05/11/2020 11:40 AM    KETUA Negative 10/26/2020 09:48 AM    KETUA neg 05/11/2020 11:40 AM    SPECGRAV >=1.030 10/26/2020 09:48 AM    SPECGRAV 1.025 05/11/2020 11:40 AM    BLOODU Negative 10/26/2020 09:48 AM    BLOODU neg 05/11/2020 11:40 AM    PHUR 5.5 10/26/2020 09:48 AM    PHUR 5.5 05/11/2020 11:40 AM    PROTEINU Negative 10/26/2020 09:48 AM    PROTEINU neg 05/11/2020 11:40 AM    UROBILINOGEN 0.2 10/26/2020 09:48 AM    NITRU Negative 10/26/2020 09:48 AM    LEUKOCYTESUR Negative 10/26/2020 09:48 AM    LEUKOCYTESUR neg 05/11/2020 11:40 AM     Urine Micro/Albumin Ratio  No results found for: MALBCR    Review of Systems  ROS:  Const: Denies chills, fever, malaise and sweats. Eyes: Denies discharge, pain, redness and visual disturbance. ENMT: Denies earaches, other ear symptoms. Denies nasal or sinus symptoms other than stated  above. Denies mouth and tongue lesions and sore throat.   CV: Denies chest discomfort, pain; diaphoresis, dizziness, edema, lightheadedness, orthopnea,  palpitations, syncope and near syncopal episode or any exertional symptoms  Resp: Denies  hemoptysis, pleuritic pain,  sputum production, cough shortness of breath and wheezing as above GI: Denies abdominal pain, change in bowel habits, hematochezia, melena, nausea and vomiting. : Denies urinary symptoms including dysuria , urgency, frequency or hematuria. Musculo: Denies musculoskeletal symptoms. Skin: Denies bruising and rash.   Neuro: Denies headache, numbness, stiff neck, tingling and focal weakness slurred speech or facial  droop  Hema/Lymph: Denies bleeding/bruising tendency and enlarged lymph nodes        Current Outpatient Medications:     doxycycline hyclate (VIBRAMYCIN) 100 MG capsule, Take 1 capsule by mouth 2 times daily for 10 days, Disp: 20 capsule, Rfl: 0    predniSONE (DELTASONE) 10 MG tablet, 4 po qd x 2d, then 3 po qd x2d, then 2 po qd x 2d, then 1 po qd x 2d, then 1/2 po qd x2d, Disp: 21 tablet, Rfl: 0    Fluticasone Furoate-Vilanterol (BREO ELLIPTA IN), Inhale into the lungs, Disp: , Rfl:     MONTELUKAST SODIUM PO, Take by mouth, Disp: , Rfl:   No Known Allergies    Past Medical History:   Diagnosis Date    Asthma     Follows with Dr Lucy [facility])     Past Surgical History:   Procedure Laterality Date    WISDOM TOOTH EXTRACTION       Family History   Problem Relation Age of Onset    Cancer Father         leukemia, dx age 68    Cancer Paternal Grandmother          leukemia 80    Heart Attack Mother          76     Social History     Tobacco Use    Smoking status: Never    Smokeless tobacco: Never   Substance Use Topics    Alcohol use: Yes     Comment: occ    Drug use: Never      Social History     Social History Narrative    [place]         Wife Manages [place], [place]        3 kids (ages 25, 16, 6)        Vitals:    22 1052   BP: 132/74   Pulse: 71   Temp: 97.5 °F (36.4 °C)   SpO2: 96%   Weight: 202 lb (91.6 kg)      Wt Readings from Last 3 Encounters:   11/16/22 202 lb (91.6 kg)   11/09/22 198 lb (89.8 kg)   02/25/21 190 lb (86.2 kg)        Physical Exam  Exam:  Const: Appears comfortable. No signs of acute distress present. Dry cough at times  Head/Face: Atraumatic on inspection. Eyes: EOMI in both eyes. No discharge from the eyes. PERRL. Sclerae clear. ENMT: Auditory canals normal. Tympanic membranes: intact and translucent. External nose WNL. Nasal mucosa is clear. Oropharynx: No erythema or exudate. Posterior pharynx is normal.  Neck: Supple. Palpation reveals no adenopathy. No masses appreciated. No JVD. Carotids: no  bruits. Resp: Respirations are unlabored. Clear to auscultation. No rales, rhonchi or wheezes appreciated  over the lungs bilaterally. CV: Rate is regular. Rhythm is regular. No gallop or rubs. No heart murmur appreciated. Extremities: No clubbing, cyanosis, or edema. No calf inflammation or tenderness. Abdomen: Bowel sounds are normoactive. Abdomen is soft, nontender, and nondistended. No  abdominal masses. No palpable hepatosplenomegaly. Lymph: No palpable or visible regional lymphadenopathy. Musculoskeletal: no acute joint inflammation. Skin: Dry and warm with no rash. Skin normal to inspection and palpation overall. Neuro: Alert and oriented. Affect: appropriate. Upper Extremities: 5/5 bilaterally. Lower Extremities:  5/5 bilaterally. Sensation intact to light touch. Reflexes: DTR's are symmetric and 2+ bilaterally. .  Cranial Nerves: Cranial nerves grossly intact. Office Labs This Visit :  No results found for this visit on 11/16/22. Assessment and Plan:   Diagnosis Orders   1. Pneumonia of both lower lobes due to infectious organism  XR CHEST (2 VW)    doxycycline hyclate (VIBRAMYCIN) 100 MG capsule    predniSONE (DELTASONE) 10 MG tablet      2. Uncomplicated asthma, unspecified asthma severity, unspecified whether persistent  predniSONE (DELTASONE) 10 MG tablet      3.  Right leg pain  US DUP LOWER EXTREMITY RIGHT BRENNON      4. Hyponatremia  Comprehensive Metabolic Panel      5. Leukocytosis, unspecified type  CBC with Auto Differential      6. Elevated blood protein  cefTRIAXone (ROCEPHIN) injection 1,000 mg          No problem-specific Assessment & Plan notes found for this encounter. 1. Pneumonia of both lower lobes due to infectious organism  Counseled extensively. Differential reviewed, including serious etiologies. Again other differential reviewed. Will need follow-up imaging. Check chest x-ray today. Discussed this versus CT. He will be following up with his pulmonologist in a week or 2 as well and he will discuss further with them. Still symptomatic although improving. Was on 5 days of Levaquin. Discussed different timeframe indications for antibiotics and different roles of antibiotics. After discussion shared decision making he would like a Rocephin shot today with standard precautions including shot injury doxycycline with precautions including C. difficile, risk benefits of probiotic reviewed and a tapering prednisone course of 40 mg as noted. Proper hydration coolmist vaporizer, albuterol as directed as needed. Uses his Breo as directed with precautions, rinse mouth after. Plain Mucinex as directed. Honey. Not interested in [place] at this time. - XR CHEST (2 VW); Future  - doxycycline hyclate (VIBRAMYCIN) 100 MG capsule; Take 1 capsule by mouth 2 times daily for 10 days  Dispense: 20 capsule; Refill: 0  - predniSONE (DELTASONE) 10 MG tablet; 4 po qd x 2d, then 3 po qd x2d, then 2 po qd x 2d, then 1 po qd x 2d, then 1/2 po qd x2d  Dispense: 21 tablet; Refill: 0    2. Uncomplicated asthma, unspecified asthma severity, unspecified whether persistent  Continue per pulmonology, sees in 1 to 2 weeks. Continue Breo, rinse mouth after, and albuterol as directed as needed.   Prednisone taper.- predniSONE (DELTASONE) 10 MG tablet; 4 po qd x 2d, then 3 po qd x2d, then 2 po qd x 2d, then 1 po qd x 2d, then 1/2 po qd x2d  Dispense: 21 tablet; Refill: 0    3. Right leg pain  Counseled extensively. Differential reviewed, including serious etiologies. Likely Sprain/cramp but rule out DVT. Check ultrasound  - US DUP LOWER EXTREMITY RIGHT BRENNON; Future    4. Hyponatremia  Counseled extensively. Differential reviewed, including serious etiologies. Repeat blood work, noted in [facility]. Proper hydration reviewed. Proper sodium intake  - Comprehensive Metabolic Panel; Future    5. Leukocytosis, unspecified type  Counseled extensively. Differential reviewed, including serious etiologies. This was in [facility] ER with pneumonia. Steroids could be contributing. Repeat. - CBC with Auto Differential; Future    6. Elevated blood protein  Proper hydration, repeat. Insert counseled  - cefTRIAXone (ROCEPHIN) injection 1,000 mg      No flowsheet data found. Plan as above. Counseled extensively and differential diagnoses relevant to above were reviewed, including serious etiologies, risks and complications, especially of left uncontrolled. If relevant, instructions and  alternatives to meds/treatment reviewed, as well as interactions, and  SE's/ADRs reviewed, notify immediately if any, discontinuing new meds if any. Plan made after discussion and shared decision making. Precautions reviewed. Proceed as above. He will be seeing pulmonology in 1 to 2 weeks. Rocephin x1.,  Doxy as directed, prednisone as directed. Albuterol as directed as needed. Repeat blood work. Check chest x-ray. As long as symptoms steadily improve/resolve since seeing pulmonology soon, he will follow-up with me about 3 weeks sooner as needed. As long as symptoms steadily improve/resolve, and medical conditions follow the expected course, FU as below, sooner PRN. Return in about 3 weeks (around 12/7/2022), or if symptoms worsen or fail to improve.                  Educational materials and/or home exercises printed for patient's review and were included in patient instructions on his/her After Visit Summary and given to patient at the end of visit. After discussion, patient and/or guardian verbalizes understanding, agrees, feels comfortable with and wishes to proceed with above treatment plan. Call for any pending results, FU sooner if abnormal, as needed or if any current symptoms persist/worsen. Advised patient to call with any new medication issues, and read all Rx info from pharmacy to assure aware of all possible risks and side effects of medication before taking. Reviewed age and gender appropriate health screening exams and vaccinations. Advised patient regarding importance of keeping up with recommended health maintenance and to schedule as soon as possible if overdue, as this is important in assessing for undiagnosed pathology, especially cancer, as well as protecting against potentially harmful/life threatening disease. Patient and/or guardian verbalizes understanding and agrees with above counseling, assessment and plan. All questions answered. Signs and symptoms to watch for discussed, serious signs and symptoms reviewed. ER if any. Escobar Gonzalez MD    Patients are advised to check with insurance company to ensure coverage and to fully understand benefits and cost prior to any testing. This note was created with the assistance of voice recognition software. Document was reviewed however may contain grammatical errors.

## 2023-09-22 ENCOUNTER — OFFICE VISIT (OUTPATIENT)
Dept: FAMILY MEDICINE CLINIC | Age: 53
End: 2023-09-22
Payer: COMMERCIAL

## 2023-09-22 VITALS
HEIGHT: 68 IN | SYSTOLIC BLOOD PRESSURE: 120 MMHG | WEIGHT: 195 LBS | TEMPERATURE: 97.6 F | HEART RATE: 57 BPM | OXYGEN SATURATION: 98 % | DIASTOLIC BLOOD PRESSURE: 72 MMHG | BODY MASS INDEX: 29.55 KG/M2

## 2023-09-22 DIAGNOSIS — J45.20 MILD INTERMITTENT ASTHMA WITHOUT COMPLICATION: Primary | ICD-10-CM

## 2023-09-22 DIAGNOSIS — J06.9 ACUTE UPPER RESPIRATORY INFECTION, UNSPECIFIED: ICD-10-CM

## 2023-09-22 DIAGNOSIS — R05.9 COUGH, UNSPECIFIED TYPE: ICD-10-CM

## 2023-09-22 PROCEDURE — 4004F PT TOBACCO SCREEN RCVD TLK: CPT | Performed by: PHYSICIAN ASSISTANT

## 2023-09-22 PROCEDURE — 3017F COLORECTAL CA SCREEN DOC REV: CPT | Performed by: PHYSICIAN ASSISTANT

## 2023-09-22 PROCEDURE — 99203 OFFICE O/P NEW LOW 30 MIN: CPT | Performed by: PHYSICIAN ASSISTANT

## 2023-09-22 PROCEDURE — G8417 CALC BMI ABV UP PARAM F/U: HCPCS | Performed by: PHYSICIAN ASSISTANT

## 2023-09-22 PROCEDURE — G8427 DOCREV CUR MEDS BY ELIG CLIN: HCPCS | Performed by: PHYSICIAN ASSISTANT

## 2023-09-22 RX ORDER — DOXYCYCLINE HYCLATE 100 MG
100 TABLET ORAL 2 TIMES DAILY
Qty: 20 TABLET | Refills: 0 | Status: SHIPPED | OUTPATIENT
Start: 2023-09-22 | End: 2023-10-02

## 2023-09-22 RX ORDER — BENZONATATE 200 MG/1
200 CAPSULE ORAL 3 TIMES DAILY PRN
Qty: 30 CAPSULE | Refills: 0 | Status: SHIPPED | OUTPATIENT
Start: 2023-09-22 | End: 2023-09-29

## 2023-09-22 RX ORDER — PREDNISONE 10 MG/1
TABLET ORAL
Qty: 18 TABLET | Refills: 0 | Status: SHIPPED | OUTPATIENT
Start: 2023-09-22

## 2024-08-26 ENCOUNTER — HOSPITAL ENCOUNTER (OUTPATIENT)
Age: 54
Discharge: HOME | End: 2024-08-28

## 2024-08-26 PROCEDURE — 88305 TISSUE EXAM BY PATHOLOGIST: CPT

## 2024-08-30 LAB — SURGICAL PATHOLOGY REPORT: NORMAL
